# Patient Record
Sex: MALE | Race: WHITE | Employment: FULL TIME | URBAN - METROPOLITAN AREA
[De-identification: names, ages, dates, MRNs, and addresses within clinical notes are randomized per-mention and may not be internally consistent; named-entity substitution may affect disease eponyms.]

---

## 2019-10-18 ENCOUNTER — TRANSFERRED RECORDS (OUTPATIENT)
Dept: HEALTH INFORMATION MANAGEMENT | Facility: CLINIC | Age: 69
End: 2019-10-18

## 2019-10-18 ENCOUNTER — MEDICAL CORRESPONDENCE (OUTPATIENT)
Dept: HEALTH INFORMATION MANAGEMENT | Facility: CLINIC | Age: 69
End: 2019-10-18

## 2019-10-24 ENCOUNTER — TELEPHONE (OUTPATIENT)
Dept: AUDIOLOGY | Facility: CLINIC | Age: 69
End: 2019-10-24

## 2019-10-28 NOTE — TELEPHONE ENCOUNTER
[facility] Call Center    Phone Message    May a detailed message be left on voicemail: yes    Reason for Call: Other: Patient's is returning Gagandeep SOTO's call//Please call patient tomorrow before 10am as patient is available at this time//Thanks.     Action Taken: Message routed to:  [facility] ([facility]): Audiology

## 2020-01-13 ENCOUNTER — OFFICE VISIT (OUTPATIENT)
Dept: AUDIOLOGY | Facility: CLINIC | Age: 70
End: 2020-01-13
Payer: COMMERCIAL

## 2020-01-13 DIAGNOSIS — H90.3 SENSORINEURAL HEARING LOSS, ASYMMETRICAL: Primary | ICD-10-CM

## 2020-01-13 NOTE — PROGRESS NOTES
AUDIOLOGY REPORT    SUBJECTIVE:  Gordo Jones is a 69 year old male who was seen in the Audiology Clinic at the [facility] for audiologic evaluation, referred by Trey Faustin M.D.. The patient reports no hearing in his left ear that was diagnosed during childhood but may have been since birth, he has never worn any amplification on the left side.  He is possibly interested in a cochlear implant after hearing a story on [facility] about them. The patient denies bilateral tinnitus, bilateral otalgia, drainage in the left ear, bilateral aural fullness, and dizziness.  The patient notes difficulty with communication in a variety of listening situations, mostly in background noise.  They were accompanied today by their wife.    OBJECTIVE:  Fall Risk Screen:  1. Have you fallen two or more times in the past year? No  2. Have you fallen and had an injury in the past year? No    Timed Up and Go Score (in seconds): not tested  Is patient a fall risk? No  Referral initiated: No  Fall Risk Assessment Completed by Audiology    Otoscopic exam indicates ears are clear of cerumen bilaterally     Pure Tone Thresholds assessed using conventional audiometry with good  reliability from 250-8000 Hz bilaterally using insert earphones and circumaural headphones     RIGHT:  normal sloping to moderate sensorineural hearing loss    LEFT:    profound sensorineural hearing loss  Negative Pavel at all frequencies    Tympanogram:    RIGHT: Hypermobile    LEFT:   Hypermobile    Reflexes (reported by stimulus ear):  Attempted but could not obtain reliable tracings due to the hypermobility    Speech Reception Threshold:    RIGHT: 30 dB HL    Speech Detection Threshold:    LEFT:   No response at 105 dB HL    Word Recognition Score:     RIGHT: 92% at 70 dB HL using NU-6 recorded word list.    LEFT:   0% at 100 dB HL using NU-6 recorded word list.      ASSESSMENT:    Today's results revealed a bilateral asymmetric  sensorineural hearing loss. Today s results were discussed with the patient in detail. Patient was counseled regarding realistic expectations for a cochlear implant. It was reviewed that given the length of deafness as well as that he has never worn amplification on that side, a cochlear implant would likely only provide some sound awareness. He expressed understanding.    PLAN:  Patient was counseled regarding hearing loss and impact on communication.  Patient is a good candidate for amplification at this time.  A trial with a BiCROS device was discussed and recommendation, he was interested in pursuing a trial but wanted to check with his insurance first. It is recommended that the patient continue to monitor his hearing status every 1-2 years, sooner if concerns arise. Patient is welcome to return for a hearing aid consultation should he desire.  Please call this clinic with questions regarding these results or recommendations.        Aditi Worthy  Audiologist  [place] License  #[id number]

## 2020-02-10 ENCOUNTER — HEALTH MAINTENANCE LETTER (OUTPATIENT)
Age: 70
End: 2020-02-10

## 2020-11-14 ENCOUNTER — HEALTH MAINTENANCE LETTER (OUTPATIENT)
Age: 70
End: 2020-11-14

## 2021-04-03 ENCOUNTER — HEALTH MAINTENANCE LETTER (OUTPATIENT)
Age: 71
End: 2021-04-03

## 2021-09-12 ENCOUNTER — HEALTH MAINTENANCE LETTER (OUTPATIENT)
Age: 71
End: 2021-09-12

## 2022-04-24 ENCOUNTER — HEALTH MAINTENANCE LETTER (OUTPATIENT)
Age: 72
End: 2022-04-24

## 2022-11-19 ENCOUNTER — HEALTH MAINTENANCE LETTER (OUTPATIENT)
Age: 72
End: 2022-11-19

## 2023-06-01 ENCOUNTER — HEALTH MAINTENANCE LETTER (OUTPATIENT)
Age: 73
End: 2023-06-01

## 2024-01-11 NOTE — PROGRESS NOTES
AUDIOLOGY REPORT    SUBJECTIVE: Gordo Jones is a 73 year old male who was seen in the Audiology Clinic at the [facility] on 1/12/24 for audiologic evaluation and a hearing aid consultation. The patient does not have an order for today's appointment; therefore, the one-time evaluation without a physician's order is used today. They were accompanied today by their wife.The patient has been seen previously in this clinic on 1/13/2020 for assessment and results indicated normal to moderate sensorineural hearing loss in the right ear and profound/ unmeasureable sensorineural hearing loss in the left ear. He reports that he has not had hearing in the left ear possibly since birth and he has not utilized amplification in that ear. The patient reports no hearing in left ear since childhood (possibly since birth) and has never worn a hearing aid in that ear. His wife indicates that his hearing has worsened. Gordo also reports he has a tendency to accumulate wax in the right ear. He denies otalgia, drainage, tinnitus, dizziness, aural fullness, or ear surgeries.       OBJECTIVE:  Abuse Screening:  Do you feel unsafe at home or work/school? No  Do you feel threatened by someone? No  Does anyone try to keep you from having contact with others, or doing things outside of your home? No  Physical signs of abuse present? No     Fall Risk Screen:  1. Have you fallen two or more times in the past year? No  2. Have you fallen and had an injury in the past year? No    Timed Up and Go Score (in seconds): not tested  Is patient a fall risk?   Referral initiated: No  Fall Risk Assessment Completed by Audiology    Otoscopic exam indicates non-occluding cerumen which was removed from the right ear using a lighted curette without incident. Left canal is clear.    Pure Tone Thresholds assessed using conventional audiometry with good reliability from 250-8000 Hz in the right ear using  insert earphones and circumaural headphones. The left ear was not tested do to documented complete loss of hearing.    RIGHT:  Normal sloping to moderate sensorineural hearing loss     LEFT:   DNT due to documented complete loss    Tympanogram:    RIGHT: Hypermobile eardrum mobility    LEFT:   Hypermobile eardrum mobility    Reflexes (reported by stimulus ear): Too much artifact to accurately interpret tracings in all conditions  RIGHT: Ipsilateral is DNT  RIGHT: Contralateral is DNT  LEFT:   Ipsilateral is DNT  LEFT:   Contralateral is DNT    Speech Reception Threshold:    RIGHT: 35 dB HL    LEFT:   DNT    Word Recognition Score:     RIGHT: 92% at 70 dB HL using NU-6 recorded word list.    LEFT:   DNT      Hearing Aid Consultation  Patient is a hearing aid candidate. They would like to move forward with a hearing aid evaluation today. Therefore, the patient was presented with different options for amplification to help aid in communication. Styles, levels of technology and monaural vs. binaural fitting were discussed. Realistic expectations in background noise were discussed. If Gordo does not find benefit from the CROS then he could wear a right hearing aid only. Phonak is chosen as his wife has Phonak hearing aids. The patient is not concerned with battery life.     The hearing aid(s) mutually chosen were:  Right: Phonak Audeo L50-R, CROS-L (left)  COLOR: Graphite  BATTERY SIZE: rechargeable  EARMOLD/TIPS: small open  CANAL/ LENGTH: 2S      ASSESSMENT: Today's results reveal normal to moderate sensorineural hearing loss in the right ear. Compared to their previous audiogram dated 1/13/2020, hearing has remained stable in the right ear. Today s results were discussed with the patient in detail.     Reviewed purchase information and warranty information with patient. The 45 day trial period was explained to patient. The patient was given a copy of the [place] [employer] consumer brochure on  purchasing hearing instruments. Patient risk factors have been provided to the patient in writing prior to the sale of the hearing aid per FDA regulation. The risk factors are also available in the User Instructional Booklet to be presented on the day of the hearing aid fitting. Hearing aid(s) ordered. Hearing aid evaluation completed. Information for prior auth sent to the insurance team.       PLAN: Gordo is a good hearing aid candidate at this time. If desired, he may follow-up with an ENT physician to evaluate the asymmetric hearing loss. Gordo is scheduled to return to this clinic for hearing aid fitting and programming. Purchase agreement will be completed at that appointment. Please call this clinic with questions regarding these results or recommendations.      Nathalie Huggins, CCC-A  Clinical Audiologist  [place] #[id number]

## 2024-01-12 ENCOUNTER — OFFICE VISIT (OUTPATIENT)
Dept: AUDIOLOGY | Facility: CLINIC | Age: 74
End: 2024-01-12
Payer: MEDICARE

## 2024-01-12 DIAGNOSIS — H90.3 SENSORINEURAL HEARING LOSS (SNHL), BILATERAL: Primary | ICD-10-CM

## 2024-01-12 PROCEDURE — 92590 PR HEARING AID EXAM MONAURAL: CPT | Mod: RT | Performed by: AUDIOLOGIST

## 2024-01-12 PROCEDURE — 92557 COMPREHENSIVE HEARING TEST: CPT | Mod: 52 | Performed by: AUDIOLOGIST

## 2024-01-12 PROCEDURE — 92567 TYMPANOMETRY: CPT | Mod: AB | Performed by: AUDIOLOGIST

## 2024-04-19 ENCOUNTER — OFFICE VISIT (OUTPATIENT)
Dept: AUDIOLOGY | Facility: CLINIC | Age: 74
End: 2024-04-19
Payer: MEDICARE

## 2024-04-19 DIAGNOSIS — H90.3 SENSORINEURAL HEARING LOSS (SNHL), BILATERAL: Primary | ICD-10-CM

## 2024-04-19 PROCEDURE — V5240 DISP FEE CONTRALATERAL BINAU: HCPCS | Performed by: AUDIOLOGIST

## 2024-04-19 PROCEDURE — V5221 HEARING AID BINAURAL BTE/BTE: HCPCS | Performed by: AUDIOLOGIST

## 2024-04-19 PROCEDURE — V5011 HEARING AID FITTING/CHECKING: HCPCS | Performed by: AUDIOLOGIST

## 2024-04-19 PROCEDURE — V5020 CONFORMITY EVALUATION: HCPCS | Performed by: AUDIOLOGIST

## 2024-04-19 NOTE — PROGRESS NOTES
AUDIOLOGY REPORT    SUBJECTIVE: Gordo Jones is a 73 year old male who was seen in the Audiology Clinic at the [facility] on 4/19/24 for a fitting of a right Phonak Audeo L50-R CJ hearing aid and a left Phonak CROS L-R. Previous results have revealed They were accompanied today by their wife, Gertrude. The patient has been seen previously in this clinic on 1/12/2024 and results revealed normal sloping to moderate sensorineural hearing loss in the right ear and profound/unmeasurable hearing loss in the left ear.  Gordo's hearing loss in the left ear has been present since childhood (possibly since birth).       OBJECTIVE: The hearing aid conformity evaluation was completed. The hearing aids were placed and they provided a good fit. Real-ear-probe-microphone measurements were completed on the [employer] system and were a good match to NAL-NL1 target with soft sounds audible, moderate sounds comfortable, and loud sounds below discomfort. UCLs are verified through maximum power output measures and demonstrate appropriate limiting of loud inputs.  The hearing aids were programmed with a closed dome, but then he was fit with a medium open dome to address occlusion.  Gordo was oriented to proper hearing aid use, care, cleaning (no water, dry brush), batteries (size: BATTERY SIZE: rechargeable, insertion/removal, toxicity, low-battery signal), aid insertion/removal, user booklet, warranty information, storage cases, and other hearing aid details. The patient confirmed understanding of hearing aid use and care, and showed proper insertion of hearing aid and use of the  while in the office today. Gordo is very satisfied with the level and sound quality of the hearing aids.  Gordo initially struggled to properly insert the hearing aids, but he was successful upon holding the  and placing that into the ear canal first.    Hearing aids were programmed as follows:  Program 1:  Automatic    Overall gain is set to 100%. SoundRecover is active. Feedback manager was completed.       EAR(S) FIT: Bilateral  HEARING AID MODEL NAME: Phonak Audeo L50 R (Right) and Phonak CROS L-R (left)  HEARING AID STYLE: -in-the-ear behind-the-ear  EARMOLDS/TIP: RITE Size 2S with medium open domes  SERIAL NUMBERS: Right: [id number] Left: [id number]  WARRANTY END DATE: 4/30/2027      The patient's 45-day trial period ends on 6/3/24.       ASSESSMENT: Phonak CROS hearing aid(s) were fit today. Verification measures were performed. Gordo signed the Hearing Aid Purchase Agreement and was given a copy, as well as details on his hearing aids. Patient was counseled that exact out of pocket amounts cannot be determined for hearing aid claims being sent to insurance. Any insurance coverage information presented to the patient is an estimate only, and is not a guarantee of payment. Patient has been advised to check with their own insurance.      PLAN: Gordo will return for follow-up in 2-3 weeks for a hearing aid review appointment at which time cleaning, manual control, and Bluetooth connectivity will be discussed. During their trial period they are encouraged to keep a list of listening situations that can be improved. Those concerns will be addressed at the next appointment. Please call this clinic with questions regarding today s appointment.      Nathalie Huggins, CCC-A  Clinical Audiologist  [place] #[id number]     Please note that the above dictation was created with voice recognition software and may contain typographic errors.

## 2024-05-15 ENCOUNTER — OFFICE VISIT (OUTPATIENT)
Dept: AUDIOLOGY | Facility: CLINIC | Age: 74
End: 2024-05-15
Payer: MEDICARE

## 2024-05-15 NOTE — PROGRESS NOTES
AUDIOLOGY REPORT    SUBJECTIVE: Gordo Jones is a 73 year old male who was seen in the Audiology Clinic at the [facility] on 5/15/24 for a follow-up check regarding the fitting of new hearing aids.  He was accompanied by his a right Phonak Audeo L50-R wife, Gertrude. The patient has been seen previously in this clinic on 1/12/2024 and results revealed normal sloping to moderate sensorineural hearing loss in the right ear and profound/unmeasurable hearing loss in the left ear.  Gordo's hearing loss in the left ear has been present since childhood (possibly since birth).  The patient has been seen previously in this clinic and was fit with a right Phonak Audeo L50-R CJ hearing aid and a left Phonak CROS L-R on 4/19/2024.      Today, Gordo reports that wind noise is too loud.  He went to [facility] the day he got his hearing aids and he was able to hear people at other details better than he could hear Gertrude.  Overall, he is noticing a lot of benefit with the devices.     OBJECTIVE: Based on patient report, NoiseBlock was strengthened in all programs to reduce background noise moderate level sounds were increased 3 dB and soft level sounds decreased 2 dB.  WindBlock was strengthened to the maximum setting in all automatic programs.  The patient is not interested in Bluetooth streaming.  Attempted to download the [employer] max, but the patient did not know his Apple ID password.  Instructions preparing were discussed and are available in the manual.  Discussed cleaning, no water or cleaning products.  Patient is provided Cerustick wax traps for the right hearing aid and a CeruDisk for the left hearing aid.  Patient is able to independently change the wax traps in the office.  He is provided additional small open domes.  He is encouraged to change the filters and domes once per month.  Discussed the hearing aid walk-in/drop off services.  Gordo reports satisfaction with  today's adjustments. They would like to keep these devices.       ASSESSMENT: A follow-up appointment for hearing aid fitting was completed today. No charge for today's services as hearing aids are in warranty.       PLAN: It is recommended that Gordo return for follow-up as needed, or at least every 9-12 months for cleaning and assessment of hearing aid.  Please call our clinic with questions or concerns.      Nathalie Huggins, CCC-A  Clinical Audiologist   [place] #[id number]    Please note that the above dictation was created with voice recognition software and may contain typographic errors.

## 2024-06-15 ENCOUNTER — HEALTH MAINTENANCE LETTER (OUTPATIENT)
Age: 74
End: 2024-06-15

## 2025-05-14 ENCOUNTER — OFFICE VISIT (OUTPATIENT)
Dept: AUDIOLOGY | Facility: CLINIC | Age: 75
End: 2025-05-14
Payer: COMMERCIAL

## 2025-05-14 DIAGNOSIS — H90.3 SENSORINEURAL HEARING LOSS (SNHL), BILATERAL: Primary | ICD-10-CM

## 2025-05-14 NOTE — PROGRESS NOTES
AUDIOLOGY REPORT    SUBJECTIVE: Gordo Jones is a 73 year old male who was seen in the Audiology Clinic at the [facility] on 5/14/25 for a follow-up check regarding the fitting of new hearing aids.  The patient has been seen previously in this clinic on 1/12/2024 and results revealed normal sloping to moderate sensorineural hearing loss in the right ear and profound/unmeasurable hearing loss in the left ear.  Gordo's hearing loss in the left ear has been present since childhood (possibly since birth).  The patient has been seen previously in this clinic and was fit with a right Phonak Audeo L50-R CJ hearing aid and a left Phonak CROS L-R on 4/19/2024. Today, Gordo reports he does not wear his hearing aids all the time and he has difficulty hearing in background noise.    OBJECTIVE: Both hearing aids were cleaned. Microphones were occluded with debris which were brushed and vacuumed. Wax traps were replaced and the CROS works properly. Gordo is provided additional cleaning supplies and we reviewed cleaning. The patient reported that sound improved. Based on his report, Noise Block was strengthened in all programs. In the speech in noise program, moderate level gain from 0927-6611 Hz was increased 2 dB. The patient does not know his Apple ID password, so he will change his password and download the max.       ASSESSMENT: A follow-up appointment for hearing aid fitting was completed today. No charge for today's services as hearing aids are in warranty.       PLAN: It is recommended that Gordo return in 3 weeks to connect his hearing aid to the max. We will also follow-up on how he heard in background noise. Please call our clinic with questions or concerns.      Nathalie Huggins, CCC-A  Clinical Audiologist   [place] #[id number]

## 2025-06-04 ENCOUNTER — OFFICE VISIT (OUTPATIENT)
Dept: AUDIOLOGY | Facility: CLINIC | Age: 75
End: 2025-06-04
Payer: COMMERCIAL

## 2025-06-04 DIAGNOSIS — H90.3 SENSORINEURAL HEARING LOSS (SNHL), BILATERAL: Primary | ICD-10-CM

## 2025-06-04 NOTE — PROGRESS NOTES
AUDIOLOGY REPORT    SUBJECTIVE: Gordo Jones is a 73 year old male who was seen in the Audiology Clinic at the [facility] on 6/04/25 for a follow-up check regarding the fitting of new hearing aids.  The patient has been seen previously in this clinic on 1/12/2024 and results revealed normal sloping to moderate sensorineural hearing loss in the right ear and profound/unmeasurable hearing loss in the left ear.  Gordo's hearing loss in the left ear has been present since childhood (possibly since birth).  The patient has been seen previously in this clinic and was fit with a right Phonak Audeo L50-R CJ hearing aid and a left Phonak CROS L-R on 4/19/2024. Today, Gordo is here to discuss the max. He also mentions that his hearing aids die after approximately 10 hours of use, so he is unable to watch TV in the evenings with his devices.     OBJECTIVE: Both devices were connected to the max which was demonstrated and discussed. No programming changes were made today. We will send both devices in to have the batteries replaced after he has returned from his trip.      ASSESSMENT: A follow-up appointment for hearing aid fitting was completed today. No charge for today's services as hearing aids are in warranty.       PLAN: Gordo will drop off both devices at the check in desk and his hearing aids will be sent in to have the batteries replaced. We will contact him to  his hearing aids once they are back from repair. Please call our clinic with questions or concerns.      Nathalie Huggins, CCC-A  Clinical Audiologist   [place] #[id number]

## 2025-06-25 ENCOUNTER — DOCUMENTATION ONLY (OUTPATIENT)
Dept: AUDIOLOGY | Facility: CLINIC | Age: 75
End: 2025-06-25
Payer: COMMERCIAL

## 2025-06-25 NOTE — PROGRESS NOTES
The patient dropped off his hearing aids and  at the Audiology Clinic on 6/25/25. They are being sent to the  for warranty repair due to reported batter life/charging issues. The patient will be contacted to  the repaired hearing aids once they are back.